# Patient Record
Sex: MALE | Race: WHITE | Employment: STUDENT | URBAN - METROPOLITAN AREA
[De-identification: names, ages, dates, MRNs, and addresses within clinical notes are randomized per-mention and may not be internally consistent; named-entity substitution may affect disease eponyms.]

---
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
## 2023-02-05 ENCOUNTER — APPOINTMENT (OUTPATIENT)
Dept: RADIOLOGY | Facility: HOSPITAL | Age: 18
End: 2023-02-05
Attending: EMERGENCY MEDICINE
Payer: COMMERCIAL

## 2023-02-05 ENCOUNTER — HOSPITAL ENCOUNTER (EMERGENCY)
Facility: HOSPITAL | Age: 18
Discharge: HOME | End: 2023-02-05
Attending: EMERGENCY MEDICINE | Admitting: EMERGENCY MEDICINE
Payer: COMMERCIAL

## 2023-02-05 ENCOUNTER — APPOINTMENT (OUTPATIENT)
Dept: CT IMAGING | Facility: HOSPITAL | Age: 18
End: 2023-02-05
Attending: EMERGENCY MEDICINE
Payer: COMMERCIAL

## 2023-02-05 VITALS
SYSTOLIC BLOOD PRESSURE: 125 MMHG | HEART RATE: 79 BPM | TEMPERATURE: 98.9 F | HEIGHT: 74 IN | OXYGEN SATURATION: 100 % | RESPIRATION RATE: 18 BRPM | WEIGHT: 175 LBS | BODY MASS INDEX: 22.46 KG/M2 | DIASTOLIC BLOOD PRESSURE: 63 MMHG

## 2023-02-05 DIAGNOSIS — U07.1 COVID-19: ICD-10-CM

## 2023-02-05 DIAGNOSIS — R55 SYNCOPE: ICD-10-CM

## 2023-02-05 LAB
ANION GAP SERPL CALCULATED.3IONS-SCNC: 11 MMOL/L (ref 7–15)
BUN SERPL-MCNC: 15.6 MG/DL (ref 6–20)
CALCIUM SERPL-MCNC: 9.4 MG/DL (ref 8.6–10)
CHLORIDE SERPL-SCNC: 102 MMOL/L (ref 98–107)
CREAT SERPL-MCNC: 1.1 MG/DL (ref 0.67–1.17)
DEPRECATED HCO3 PLAS-SCNC: 27 MMOL/L (ref 22–29)
ERYTHROCYTE [DISTWIDTH] IN BLOOD BY AUTOMATED COUNT: 13 % (ref 10–15)
FLUAV RNA SPEC QL NAA+PROBE: NEGATIVE
FLUBV RNA RESP QL NAA+PROBE: NEGATIVE
GFR SERPL CREATININE-BSD FRML MDRD: >90 ML/MIN/1.73M2
GLUCOSE SERPL-MCNC: 116 MG/DL (ref 70–99)
HCT VFR BLD AUTO: 44.7 % (ref 40–53)
HGB BLD-MCNC: 14.9 G/DL (ref 13.3–17.7)
HOLD SPECIMEN: NORMAL
HOLD SPECIMEN: NORMAL
MAGNESIUM SERPL-MCNC: 2 MG/DL (ref 1.7–2.3)
MCH RBC QN AUTO: 29.8 PG (ref 26.5–33)
MCHC RBC AUTO-ENTMCNC: 33.3 G/DL (ref 31.5–36.5)
MCV RBC AUTO: 89 FL (ref 78–100)
PLATELET # BLD AUTO: 190 10E3/UL (ref 150–450)
POTASSIUM SERPL-SCNC: 4.1 MMOL/L (ref 3.4–5.3)
RBC # BLD AUTO: 5 10E6/UL (ref 4.4–5.9)
RSV RNA SPEC NAA+PROBE: NEGATIVE
SARS-COV-2 RNA RESP QL NAA+PROBE: POSITIVE
SODIUM SERPL-SCNC: 140 MMOL/L (ref 136–145)
TROPONIN T SERPL HS-MCNC: 7 NG/L
TROPONIN T SERPL HS-MCNC: <6 NG/L
WBC # BLD AUTO: 10.1 10E3/UL (ref 4–11)

## 2023-02-05 PROCEDURE — 258N000003 HC RX IP 258 OP 636: Performed by: EMERGENCY MEDICINE

## 2023-02-05 PROCEDURE — 85027 COMPLETE CBC AUTOMATED: CPT | Performed by: EMERGENCY MEDICINE

## 2023-02-05 PROCEDURE — 70450 CT HEAD/BRAIN W/O DYE: CPT

## 2023-02-05 PROCEDURE — 83735 ASSAY OF MAGNESIUM: CPT | Performed by: EMERGENCY MEDICINE

## 2023-02-05 PROCEDURE — 93005 ELECTROCARDIOGRAM TRACING: CPT | Performed by: EMERGENCY MEDICINE

## 2023-02-05 PROCEDURE — 99285 EMERGENCY DEPT VISIT HI MDM: CPT | Mod: 25,CS

## 2023-02-05 PROCEDURE — C9803 HOPD COVID-19 SPEC COLLECT: HCPCS

## 2023-02-05 PROCEDURE — 71046 X-RAY EXAM CHEST 2 VIEWS: CPT

## 2023-02-05 PROCEDURE — 84484 ASSAY OF TROPONIN QUANT: CPT | Mod: 91 | Performed by: EMERGENCY MEDICINE

## 2023-02-05 PROCEDURE — 80048 BASIC METABOLIC PNL TOTAL CA: CPT | Performed by: EMERGENCY MEDICINE

## 2023-02-05 PROCEDURE — 96361 HYDRATE IV INFUSION ADD-ON: CPT

## 2023-02-05 PROCEDURE — 93005 ELECTROCARDIOGRAM TRACING: CPT | Performed by: STUDENT IN AN ORGANIZED HEALTH CARE EDUCATION/TRAINING PROGRAM

## 2023-02-05 PROCEDURE — 84484 ASSAY OF TROPONIN QUANT: CPT | Performed by: EMERGENCY MEDICINE

## 2023-02-05 PROCEDURE — 87637 SARSCOV2&INF A&B&RSV AMP PRB: CPT | Performed by: EMERGENCY MEDICINE

## 2023-02-05 PROCEDURE — 96360 HYDRATION IV INFUSION INIT: CPT

## 2023-02-05 PROCEDURE — 36415 COLL VENOUS BLD VENIPUNCTURE: CPT | Performed by: EMERGENCY MEDICINE

## 2023-02-05 RX ADMIN — SODIUM CHLORIDE 1000 ML: 9 INJECTION, SOLUTION INTRAVENOUS at 10:41

## 2023-02-05 RX ADMIN — SODIUM CHLORIDE 1000 ML: 9 INJECTION, SOLUTION INTRAVENOUS at 09:49

## 2023-02-05 ASSESSMENT — ACTIVITIES OF DAILY LIVING (ADL): ADLS_ACUITY_SCORE: 35

## 2023-02-05 NOTE — ED PROVIDER NOTES
EMERGENCY DEPARTMENT ENCOUNTER      NAME: Leonard Weber  AGE: 17 year old male  YOB: 2005  MRN: [id number]  EVALUATION DATE & TIME: 2/5/2023  9:17 AM    PCP: Cristian Landers    ED PROVIDER: Sara Alberts PA-C      Chief Complaint   Patient presents with     Syncope     Fall         FINAL IMPRESSION:  1. Syncope    2. COVID-19        MEDICAL DECISION MAKING:    Pertinent Labs & Imaging studies reviewed. (See chart for details)  17 year old male presents to the Emergency Department for evaluation after a syncopal episode. The patient was at [employer] this morning standing up on a footstool when he became lightheaded, had tunnel vision and then he ended up passing out.  Patient came to within less than 10 minutes of the syncopal episode however, he has a history of benign heart murmur and QT prolongation and family was concerned so he was brought to the emergency department.  On my evaluation, the patient was initially hypertensive at 141/65 but was otherwise vitally stable.  Examination with GCS 15.  5 out of 5 strength and sensation in bilateral upper and lower extremities.  Heart was in regular rate and rhythm and lungs are clear to auscultation bilaterally.  Abdomen was soft, nontender without any rebound or guarding.  Head was atraumatic.  Differential diagnosis included, but is not limited to, arrhythmia, electrolyte derangement, COVID, influenza, RSV, pneumonia, pneumothorax, intracranial bleed, ACS, PE.    CBC without any derangements.  BMP with slightly elevated glucose of 116 but no other derangements.  Magnesium was normal at 2.0.  Initial troponin was 7 and repeat troponin 2 hours later was less than 6.  EKG showed normal sinus rhythm with nonspecific ST changes and a new right bundle branch block.  Patient is not having any chest pain or difficulty breathing at this time and with negative troponins I do not feel ACS is the likely cause of his syncope.  Patient is not having any  shortness of breath and is PERC negative and I do not feel PE is the likely cause of his symptoms I did not feel D-dimer or CT PE study was indicated at this time.  With him falling from a stepstool and him being quite tall I did discuss possible CT scan to rule out any intracranial bleed.  Him and his mother did decide on pursuing CT imaging of the head which was fortunately negative for any fracture or intracranial bleeding.  He was not have any neck pain and C-spine was cleared clinically and I did not feel CT cervical spine was indicated at this time.  Patient did report that he was not feeling well last night with some chills and body aches, but did feel better this morning but not at his baseline.  He also reports not eating or drinking anything this morning and decreased liquid intake over the last day or so.  Because of this I did order a influenza, COVID testing which did return positive for COVID.  With negative work-up here, very consistent with vasovagal episode and positive COVID test I do feel that this is the likely cause of his syncopal episode today.  He was given 2 L of fluids with significant improvement of his symptoms.  He is not having any cough, fevers, shortness of breath and we did discuss Paxil a bit and it was deferred at this time as he is asymptomatic for COVID.  We discussed Tylenol and ibuprofen as needed for any fevers and plenty of fluids and rest.  With his cardiac history of prolonged QT and benign heart murmur but no evaluation since 2016, I did recommend close follow-up with cardiology.  I did place a referral to our rapid access clinic who should be reaching out to the patient in the next few days.  I did and the number for our cardiac clinic indicates that they do not reach out and told him to call if he does not hear from them in a few days.  All results and plan of care were discussed with the patient and his mother and they were in agreement and understanding.  Patient was  asymptomatic without any headache, nausea, dizziness, chest pain, shortness of breath at time of discharge and he was discharged with his mother in stable condition to follow-up as instructed.    Medical Decision Making    History:    Supplemental history from: Documented in chart, if applicable    External Record(s) reviewed: Outpatient Record: 9/20/23 cardiology note    Work Up:    Chart documentation includes differential considered and any EKGs or imaging independently interpreted by provider, where specified.    In additional to work up documented, I considered the following work up: Documented in chart, if applicable.    External consultation:    Discussion of management with another provider: Documented in chart, if applicable    Complicating factors:    Care impacted by chronic illness: N/A    Care affected by social determinants of health: Access to Medical Care    Disposition considerations: Discharge. No recommendations on prescription strength medication(s). See documentation for any additional details.    ED COURSE:  9:30 AM I met with the patient, obtained history, performed an initial exam, and discussed options and plan for diagnostics and treatment here in the ED.    9:50 AM I staffed the patient with Angelica Davis MD.    10:45 AM I rechecked on the patient and he was feeling improved     12:15 PM Patient discharged after being provided with extensive anticipatory guidance and given return precautions, importance of PCP follow-up emphasized.    At the conclusion of the encounter I discussed the results of all of the tests and the disposition. The questions were answered. The patient or family acknowledged understanding and was agreeable with the care plan.     MEDICATIONS GIVEN IN THE EMERGENCY:  Medications   0.9% sodium chloride BOLUS (0 mLs Intravenous Stopped 2/5/23 1043)   0.9% sodium chloride BOLUS (0 mLs Intravenous Stopped 2/5/23 1144)       NEW PRESCRIPTIONS STARTED AT TODAY'S ER  VISIT  Discharge Medication List as of 2/5/2023 12:29 PM               =================================================================    HPI:    Patient information was obtained from: The patient    Use of Interpretor: N/A         Leonard Weber is a 17 year old male with a pertinent history of benign heart murmur, prolonged QT who presents to this ED via private vehicle for evaluation after having a syncopal episode. The patient was standing up on a step stool adjusting a camera when he started to feel lightheaded, had tunnel vision and passed out. He was out for about 30 seconds per witnesses. With him passing out and his heart history he and family were concerned and he was brought to the ED. On my evaluation, the patient denies any chest pain or shortness of breath now or prior to falling. He did feel some chills and body aches last night but is feeling improved this morning. He does note not drinking a lot of fluids over the past few days and some decreased food intake as well. He has not had anything to eat or drink today. He denies any fevers, cough, abdominal pain, nausea, vomiting, diarrhea or other concerning symptoms.      REVIEW OF SYSTEMS:  Negative unless otherwise stated in the above HPI.      PAST MEDICAL HISTORY:  No past medical history on file.    PAST SURGICAL HISTORY:  No past surgical history on file.        CURRENT MEDICATIONS:    No current facility-administered medications for this encounter.    Current Outpatient Medications:      adapalene (DIFFERIN) 0.1 % external cream, Apply topically At Bedtime (Patient not taking: Reported on 6/7/2019), Disp: 45 g, Rfl: 11     benzoyl peroxide (BENZOYL PEROXIDE WASH) 5 % external liquid, USE 1 APPLICATION WHEN TAKING A SHOWER, Disp: 140 g, Rfl: 0     benzoyl peroxide 5 % topical gel, Apply topically daily, Disp: 90 g, Rfl: 3     BENZOYL PEROXIDE WASH 5 % external liquid, USE WHEN TAKING A SHOWER., Disp: 140 g, Rfl: 0      ALLERGIES:  Allergies  "  Allergen Reactions     No Known Drug Allergies        FAMILY HISTORY:  No family history on file.    SOCIAL HISTORY:   Social History     Socioeconomic History     Marital status: Single   Tobacco Use     Smoking status: Never     Smokeless tobacco: Never   Substance and Sexual Activity     Alcohol use: No     Drug use: No     Sexual activity: Never     Social Determinants of Health     Housing Stability: Unknown     Unable to Pay for Housing in the Last Year: No     Unstable Housing in the Last Year: No       VITALS:  Patient Vitals for the past 24 hrs:   BP Temp Temp src Pulse Resp SpO2 Height Weight   02/05/23 1232 -- -- -- 79 18 100 % -- --   02/05/23 1231 -- -- -- 79 22 100 % -- --   02/05/23 1230 125/63 -- -- 73 14 100 % -- --   02/05/23 1227 -- -- -- 80 17 100 % -- --   02/05/23 1222 -- -- -- 78 14 99 % -- --   02/05/23 1212 132/66 -- -- 79 13 100 % -- --   02/05/23 1157 138/65 -- -- 81 18 99 % -- --   02/05/23 1150 (!) 147/74 -- -- 80 14 100 % -- --   02/05/23 1142 -- -- -- 78 14 100 % -- --   02/05/23 1135 (!) 140/72 -- -- 81 15 100 % -- --   02/05/23 1120 (!) 141/77 -- -- 84 14 100 % -- --   02/05/23 1105 (!) 142/71 -- -- 82 12 100 % -- --   02/05/23 1028 136/80 -- -- 84 24 100 % -- --   02/05/23 1013 (!) 140/75 -- -- 86 19 100 % -- --   02/05/23 1000 132/77 -- -- 92 29 100 % -- --   02/05/23 0943 120/69 -- -- 87 16 100 % -- --   02/05/23 0923 124/66 -- -- -- -- -- -- --   02/05/23 0913 (!) 141/65 98.9  F (37.2  C) Temporal 94 18 100 % 1.88 m (6' 2\") 79.4 kg (175 lb)       PHYSICAL EXAM    Constitutional: Well developed, Well nourished, NAD  HENT: Normocephalic, Atraumatic, Bilateral external ears normal, Oropharynx normal, mucous membranes moist, Nose normal.   Neck: Normal range of motion, No tenderness, Supple, No stridor.  Eyes: PERRL, EOMI, Conjunctiva normal, No discharge.   Respiratory: Normal breath sounds, No respiratory distress, No wheezing, Speaks full sentences easily. No " cough.  Cardiovascular: Normal heart rate, Regular rhythm, No murmurs, No rubs, No gallops.   Musculoskeletal: 2+ DP pulses. No edema. No cyanosis, No clubbing. Good range of motion in all major joints. No tenderness to palpation or major deformities noted. No tenderness of the CTLS spine.   Integument: Warm, Dry, No erythema, No rash. No petechiae.  Neurologic: GCS 15. Alert & oriented x 3, Normal motor function, Normal sensory function, No focal deficits noted. Normal gait.  Psychiatric: Affect normal, Judgment normal, Mood normal. Cooperative.    LAB:  All pertinent labs reviewed and interpreted.  Recent Results (from the past 24 hour(s))   Extra Blue Top Tube    Collection Time: 02/05/23  9:45 AM   Result Value Ref Range    Hold Specimen JIC    Extra Red Top Tube    Collection Time: 02/05/23  9:45 AM   Result Value Ref Range    Hold Specimen JIC    CBC (+ platelets, no diff)    Collection Time: 02/05/23  9:45 AM   Result Value Ref Range    WBC Count 10.1 4.0 - 11.0 10e3/uL    RBC Count 5.00 4.40 - 5.90 10e6/uL    Hemoglobin 14.9 13.3 - 17.7 g/dL    Hematocrit 44.7 40.0 - 53.0 %    MCV 89 78 - 100 fL    MCH 29.8 26.5 - 33.0 pg    MCHC 33.3 31.5 - 36.5 g/dL    RDW 13.0 10.0 - 15.0 %    Platelet Count 190 150 - 450 10e3/uL   Basic metabolic panel    Collection Time: 02/05/23  9:45 AM   Result Value Ref Range    Sodium 140 136 - 145 mmol/L    Potassium 4.1 3.4 - 5.3 mmol/L    Chloride 102 98 - 107 mmol/L    Carbon Dioxide (CO2) 27 22 - 29 mmol/L    Anion Gap 11 7 - 15 mmol/L    Urea Nitrogen 15.6 6.0 - 20.0 mg/dL    Creatinine 1.10 0.67 - 1.17 mg/dL    Calcium 9.4 8.6 - 10.0 mg/dL    Glucose 116 (H) 70 - 99 mg/dL    GFR Estimate >90 >60 mL/min/1.73m2   Magnesium    Collection Time: 02/05/23  9:45 AM   Result Value Ref Range    Magnesium 2.0 1.7 - 2.3 mg/dL   Troponin T, High Sensitivity (now)    Collection Time: 02/05/23  9:45 AM   Result Value Ref Range    Troponin T, High Sensitivity 7 <=22 ng/L   Symptomatic  Influenza A/B & SARS-CoV2 (COVID-19) Virus PCR Multiplex Nasopharyngeal    Collection Time: 02/05/23 10:03 AM    Specimen: Nasopharyngeal; Swab   Result Value Ref Range    Influenza A PCR Negative Negative    Influenza B PCR Negative Negative    RSV PCR Negative Negative    SARS CoV2 PCR Positive (A) Negative   Troponin T, High Sensitivity (now)    Collection Time: 02/05/23 11:54 AM   Result Value Ref Range    Troponin T, High Sensitivity <6 <=22 ng/L         RADIOLOGY:  Reviewed all pertinent imaging. Please see official radiology report.  Head CT w/o contrast   Final Result   IMPRESSION:   1.  No evidence of acute hemorrhage or other acute intracranial abnormality.   2.  No evidence of acute calvarial fracture.         Chest XR,  PA & LAT   Final Result   IMPRESSION: PA and lateral views of the chest were obtained. Cardiomediastinal silhouette is within normal limits. No suspicious focal pulmonary opacities. No significant pleural effusion or pneumothorax.                  PROCEDURES:   None.     Sara Alberts PA-C  Emergency Medicine  [facility]  2/5/2023      Sara Alberts PA-C  02/05/23 1534

## 2023-02-05 NOTE — ED TRIAGE NOTES
"Pt presents after a syncopal episode this morning while standing. Pt states he fell back and hit the back of his head. Denies pain to head. This hasn't happened to him before. Stated he felt \"kind of sick\" last night. Pt has hx of prolonged QT.     Triage Assessment       Row Name 02/05/23 0914       Triage Assessment (Adult)    Airway WDL WDL       Respiratory WDL    Respiratory WDL WDL       Skin Circulation/Temperature WDL    Skin Circulation/Temperature WDL WDL       Cardiac WDL    Cardiac WDL WDL       Peripheral/Neurovascular WDL    Peripheral Neurovascular WDL WDL       Cognitive/Neuro/Behavioral WDL    Cognitive/Neuro/Behavioral WDL WDL                  "

## 2023-02-05 NOTE — ED NOTES
Pt able to walk to room #15 without difficulty. EKG being done now. Denies neck/back pain with palp. Did pass out, small lump on posterior head, left side. Denies pain. H/o long QT syndrome

## 2023-02-05 NOTE — ED PROVIDER NOTES
"Emergency Department Staff Physician Note     I had a face to face encounter with this patient seen by the Advanced Practice Provider (HUSSEIN).  I have seen, examined, and discussed the patient with the HUSSEIN and agree with their assessment and plan of management.    Relevant HPI:     Leonard Weber is a 17 year old male who presents to the Emergency Department for evaluation of a syncopal episode. Patient with a history of murmur and prolonged QT. Had heart monitor and echo which were normal. Today had a syncopal episode when standing. Developed tunnel vision and syncopized. Fell and hit back of head. Out for seconds. Otherwise denies any headache, vision changes and neck pain.     I, Marizol Guerra, am serving as a scribe to document services personally performed by Angelica Davis MD, based on my observations and the provider's statements to me.   I, Angelica Davis MD, attest that Marizol Guerra was acting in a scribe capacity, has observed my performance of the services and has documented them in accordance with my direction.    ED Course:  9:51 AM I received the patient report from the HUSSEIN, Sara Alberts PA-C. I agree with their assessment and plan of management, and I will see the patient.  9:56 AM   I met with the patient to introduce myself, gather additional history, perform my initial exam, and discuss the plan.   12:39 PM Spoke with patient and mother. Discussed plans for discharge. Patient and mother in agreement.       Brief Physical Exam:  /63   Pulse 79   Temp 98.9  F (37.2  C) (Temporal)   Resp 18   Ht 1.88 m (6' 2\")   Wt 79.4 kg (175 lb)   SpO2 100%   BMI 22.47 kg/m       Constitutional:  Well developed, well nourished, no acute distress   EYES: Conjunctivae clear  HENT:  Atraumatic, normocephalic  Respiratory:  No respiratory distress, normal breath sounds  Cardiovascular:  Normal rate, normal rhythm, no murmurs, capillary refill normal.   GI:  Soft, nondistended, nontender, no " palpable masses, no rebound, no guarding   Musculoskeletal:  No edema.  No cyanosis.  Range of motion major extremities intact.  no midline C/T/L ttp  Integument: Warm, Dry, No erythema, No rash.   Neurologic:  Alert & oriented, no focal deficits noted, ambulatory  Psych: Affect normal, Mood normal.      EKG #1  Sinus rhythm right upper branch pattern.  Normal anterior progression normal axis  RVR prime pattern in V2.    No evidence of Brugada  HOLCM  ARVD  WPW  No ST elevation  No RAD  No prolong QT    Time:[id number]    Ventricular rate 85 bmp  Axis normal  IL interval 146 ms  QRS duration 108 ms  QT/TIK333/426  ms    Compared to previous EKG on September 2016 there was a pediatric EKG date RVR prime pattern was not seen in V2.  QTc was 453    Differential diagnoses include but not limited to     Impression / ED Plan:  I personally saw the patient and performed a substantive portion of the visit including all aspects of the medical decision making.     Leonard Weber is a 17 year old male presents to the ED for evaluation of syncope  Fell ill yesterday but well today  No chest pain no sob  No history of syncope  No family history of sudden death marfans or PE  ekg RVR in lead V2  cxr normal mediastinum  Troponin not increasing and normal for male  Observed in telemetry no arrythmia  Reliable  Has someone with him to observed  + covid vaccinated currently asymptomatic  Immunocompetent   Strict discharge instructions and return precautions given  Patient and mother feels comfortable with discharge    1. Syncope    2. COVID-19        Angelica Davis MD  Staff Physician  [facility]        Angelica Davis MD  02/06/23 6610

## 2023-02-05 NOTE — DISCHARGE INSTRUCTIONS
You were seen and evaluated here in the ED after having a syncopal episode.  Fortunately, work-up including labs, EKG, chest x-ray and CT of the head were without signs of heart attack, arrhythmia, electrolyte derangement, intracranial bleeding, fracture.  As we discussed, you are positive for COVID and I do feel that with your recent chills, decreased oral intake that you likely had a vasovagal episode causing your syncope.    In regards to your COVID, we did discuss Paxil even and at this time deferred treatment as you are not feeling symptomatic.  I recommend plenty of fluids, rest and Tylenol/ibuprofen as needed for pain and fevers.  Per guidelines, you should 14 yourself for the next 5 days.  If you are continue to be asymptomatic you should wear a mask in public for the next 5 days until you are 10 days out from symptom onset/positive test.  If you are symptomatic after 5 days, he should remain in quarantine for the full 10 days.    You are feeling improved with 2 L of fluids here in the emergency department I feel comfortable sending you home to follow-up as an outpatient.  I did place a referral for our rapid access cardiology clinic and they should be calling you within a few days to get an appointment scheduled.    If you would like to follow-up with your cardiologist you are more than welcome.  You should follow-up with your primary care provider as needed.    Return to the emergency department for repeat episodes of syncope, chest pain, shortness of breath or any other concerning symptoms.

## 2023-07-08 ENCOUNTER — HEALTH MAINTENANCE LETTER (OUTPATIENT)
Age: 18
End: 2023-07-08

## 2024-04-18 SDOH — HEALTH STABILITY: PHYSICAL HEALTH: ON AVERAGE, HOW MANY MINUTES DO YOU ENGAGE IN EXERCISE AT THIS LEVEL?: 60 MIN

## 2024-04-18 SDOH — HEALTH STABILITY: PHYSICAL HEALTH: ON AVERAGE, HOW MANY DAYS PER WEEK DO YOU ENGAGE IN MODERATE TO STRENUOUS EXERCISE (LIKE A BRISK WALK)?: 3 DAYS

## 2024-04-18 ASSESSMENT — SOCIAL DETERMINANTS OF HEALTH (SDOH): HOW OFTEN DO YOU GET TOGETHER WITH FRIENDS OR RELATIVES?: MORE THAN THREE TIMES A WEEK

## 2024-04-25 ENCOUNTER — OFFICE VISIT (OUTPATIENT)
Dept: FAMILY MEDICINE | Facility: CLINIC | Age: 19
End: 2024-04-25
Payer: COMMERCIAL

## 2024-04-25 VITALS
RESPIRATION RATE: 16 BRPM | SYSTOLIC BLOOD PRESSURE: 136 MMHG | WEIGHT: 176 LBS | TEMPERATURE: 98.4 F | BODY MASS INDEX: 21.88 KG/M2 | DIASTOLIC BLOOD PRESSURE: 76 MMHG | HEART RATE: 89 BPM | HEIGHT: 75 IN | OXYGEN SATURATION: 99 %

## 2024-04-25 DIAGNOSIS — Z00.00 ROUTINE GENERAL MEDICAL EXAMINATION AT A HEALTH CARE FACILITY: Primary | ICD-10-CM

## 2024-04-25 DIAGNOSIS — L30.9 ECZEMA, UNSPECIFIED TYPE: ICD-10-CM

## 2024-04-25 DIAGNOSIS — I86.1 LEFT VARICOCELE: ICD-10-CM

## 2024-04-25 PROCEDURE — 90715 TDAP VACCINE 7 YRS/> IM: CPT | Performed by: FAMILY MEDICINE

## 2024-04-25 PROCEDURE — 90471 IMMUNIZATION ADMIN: CPT | Performed by: FAMILY MEDICINE

## 2024-04-25 PROCEDURE — 99213 OFFICE O/P EST LOW 20 MIN: CPT | Mod: 25 | Performed by: FAMILY MEDICINE

## 2024-04-25 PROCEDURE — 99395 PREV VISIT EST AGE 18-39: CPT | Mod: 25 | Performed by: FAMILY MEDICINE

## 2024-04-25 RX ORDER — TRIAMCINOLONE ACETONIDE 1 MG/G
CREAM TOPICAL 2 TIMES DAILY
Qty: 45 G | Refills: 3 | Status: SHIPPED | OUTPATIENT
Start: 2024-04-25

## 2024-04-25 NOTE — PATIENT INSTRUCTIONS
Preventive Care Advice   This is general advice given by our system to help you stay healthy. However, your care team may have specific advice just for you. Please talk to your care team about your preventive care needs.  Nutrition  Eat 5 or more servings of fruits and vegetables each day.  Try wheat bread, brown rice and whole grain pasta (instead of white bread, rice, and pasta).  Get enough calcium and vitamin D. Check the label on foods and aim for 100% of the RDA (recommended daily allowance).  Lifestyle  Exercise at least 150 minutes each week   (30 minutes a day, 5 days a week).  Do muscle strengthening activities 2 days a week. These help control your weight and prevent disease.  No smoking.  Wear sunscreen to prevent skin cancer.  Have a dental exam and cleaning every 6 months.  Yearly exams  See your health care team every year to talk about:  Any changes in your health.  Any medicines your care team has prescribed.  Preventive care, family planning, and ways to prevent chronic diseases.  Shots (vaccines)   HPV shots (up to age 26), if you've never had them before.  Hepatitis B shots (up to age 59), if you've never had them before.  COVID-19 shot: Get this shot when it's due.  Flu shot: Get a flu shot every year.  Tetanus shot: Get a tetanus shot every 10 years.  Pneumococcal, hepatitis A, and RSV shots: Ask your care team if you need these based on your risk.  Shingles shot (for age 50 and up).  General health tests  Diabetes screening:  Starting at age 35, Get screened for diabetes at least every 3 years.  If you are younger than age 35, ask your care team if you should be screened for diabetes.  Cholesterol test: At age 39, start having a cholesterol test every 5 years, or more often if advised.  Bone density scan (DEXA): At age 50, ask your care team if you should have this scan for osteoporosis (brittle bones).  Hepatitis C: Get tested at least once in your life.  STIs (sexually transmitted  infections)  Before age 24: Ask your care team if you should be screened for STIs.  After age 24: Get screened for STIs if you're at risk. You are at risk for STIs (including HIV) if:  You are sexually active with more than one person.  You don't use condoms every time.  You or a partner was diagnosed with a sexually transmitted infection.  If you are at risk for HIV, ask about PrEP medicine to prevent HIV.  Get tested for HIV at least once in your life, whether you are at risk for HIV or not.  Cancer screening tests  Cervical cancer screening: If you have a cervix, begin getting regular cervical cancer screening tests at age 21. Most people who have regular screenings with normal results can stop after age 65. Talk about this with your provider.  Breast cancer scan (mammogram): If you've ever had breasts, begin having regular mammograms starting at age 40. This is a scan to check for breast cancer.  Colon cancer screening: It is important to start screening for colon cancer at age 45.  Have a colonoscopy test every 10 years (or more often if you're at risk) Or, ask your provider about stool tests like a FIT test every year or Cologuard test every 3 years.  To learn more about your testing options, visit: https://www.[employer]/417401.pdf.  For help making a decision, visit: https://bit.ly/ha11584.  Prostate cancer screening test: If you have a prostate and are age 55 to 69, ask your provider if you would benefit from a yearly prostate cancer screening test.  Lung cancer screening: If you are a current or former smoker age 50 to 80, ask your care team if ongoing lung cancer screenings are right for you.  For informational purposes only. Not to replace the advice of your health care provider. Copyright   2023 [employer]. All rights reserved. Clinically reviewed by the [facility]. [employer] [id number] - REV 01/24.

## 2024-04-25 NOTE — PROGRESS NOTES
Preventive Care Visit  [facility]  ALANNAH NUEGNT DO, Family Medicine  Apr 25, 2024      Assessment & Plan   Problem List Items Addressed This Visit    None  Visit Diagnoses       Routine general medical examination at a health care facility    -  Primary    Eczema, unspecified type        Relevant Medications    triamcinolone (KENALOG) 0.1 % external cream    Left varicocele               Trial steroid cream for eczema, return if no improvement and biopsy at that time    No action required for painless left varicocele    Patient has been advised of split billing requirements and indicates understanding: Yes         Counseling  Appropriate preventive services were discussed with this patient, including applicable screening as appropriate for fall prevention, nutrition, physical activity, Tobacco-use cessation, weight loss and cognition.  Checklist reviewing preventive services available has been given to the patient.  Reviewed patient's diet, addressing concerns and/or questions.   He is at risk for lack of exercise and has been provided with information to increase physical activity for the benefit of his well-being.           Subjective   Leonard is a 18 year old, presenting for the following:  Physical        4/25/2024    10:02 AM   Additional Questions   Roomed by Karen GARCÍA CMA        Health Care Directive  Patient does not have a Health Care Directive or Living Will: Discussed advance care planning with patient; information given to patient to review.    HPI        New rash on legs, vaseline does not help  Varicocele left testicle, known, not painful      4/18/2024   General Health   How would you rate your overall physical health? Excellent   Feel stress (tense, anxious, or unable to sleep) Not at all         4/18/2024   Nutrition   Three or more servings of calcium each day? Yes   Diet: Regular (no restrictions)   How many servings of fruit and vegetables per day? (!) 0-1   How many  "sweetened beverages each day? 0-1         4/18/2024   Exercise   Days per week of moderate/strenous exercise 3 days   Average minutes spent exercising at this level 60 min         4/18/2024   Social Factors   Frequency of gathering with friends or relatives More than three times a week   Worry food won't last until get money to buy more No   Food not last or not have enough money for food? No   Do you have housing?  Yes   Are you worried about losing your housing? No   Lack of transportation? No   Unable to get utilities (heat,electricity)? No         4/18/2024   Dental   Dentist two times every year? Yes            Today's PHQ-2 Score:       4/25/2024    12:27 AM   PHQ-2 ( 1999 [employer])   Q1: Little interest or pleasure in doing things 0   Q2: Feeling down, depressed or hopeless 0   PHQ-2 Score 0   Q1: Little interest or pleasure in doing things Not at all   Q2: Feeling down, depressed or hopeless Not at all   PHQ-2 Score 0           4/18/2024   Substance Use   Alcohol more than 3/day or more than 7/wk Not Applicable   Do you use any other substances recreationally? No     Social History     Tobacco Use    Smoking status: Never     Passive exposure: Never    Smokeless tobacco: Never   Vaping Use    Vaping status: Never Used   Substance Use Topics    Alcohol use: No    Drug use: No             4/18/2024   One time HIV Screening   Previous HIV test? No         4/18/2024   STI Screening   New sexual partner(s) since last STI/HIV test? No         4/18/2024   Contraception/Family Planning   Questions about contraception or family planning No        Reviewed and updated as needed this visit by Provider                             Objective    Exam  /76 (BP Location: Right arm, Patient Position: Chair, Cuff Size: Adult Regular)   Pulse 89   Temp 98.4  F (36.9  C) (Temporal)   Resp 16   Ht 1.892 m (6' 2.5\")   Wt 79.8 kg (176 lb)   SpO2 99%   BMI 22.29 kg/m     Estimated body mass index is 22.29 kg/m  as " "calculated from the following:    Height as of this encounter: 1.892 m (6' 2.5\").    Weight as of this encounter: 79.8 kg (176 lb).    Physical Exam  GENERAL: alert and no distress  HENT: ear canals and TM's normal, nose and mouth without ulcers or lesions  NECK: no adenopathy, no asymmetry, masses, or scars  RESP: lungs clear to auscultation - no rales, rhonchi or wheezes  CV: regular rate and rhythm, normal S1 S2, no S3 or S4, no murmur, click or rub, no peripheral edema  ABDOMEN: soft, nontender, no hepatosplenomegaly, no masses and bowel sounds normal  MS: no gross musculoskeletal defects noted, no edema  : Normal male external genitalia. Juvenal stage 5,  both testes descended, no hernia.    Left varicocele     Media Information      Document Information    Other: Photograph   Right calf   04/25/2024 10:25 AM   Attached To:   Office Visit on 4/25/24 with Gallo Pearl DO   Source Information    Gallo Pearl DO [facility]   Document History           Media Information      Document Information    Other: Photograph   Left calf   04/25/2024 10:25 AM   Attached To:   Office Visit on 4/25/24 with Gallo Pearl DO   Source Information    Gallo Pearl DO [facility]   Document History      Signed Electronically by: GALLO PEARL DO    "

## 2025-05-31 ENCOUNTER — HEALTH MAINTENANCE LETTER (OUTPATIENT)
Age: 20
End: 2025-05-31